# Patient Record
Sex: MALE | Race: WHITE | URBAN - METROPOLITAN AREA
[De-identification: names, ages, dates, MRNs, and addresses within clinical notes are randomized per-mention and may not be internally consistent; named-entity substitution may affect disease eponyms.]

---

## 2022-01-21 ENCOUNTER — OFFICE VISIT (OUTPATIENT)
Dept: FAMILY MEDICINE CLINIC | Age: 42
End: 2022-01-21
Payer: COMMERCIAL

## 2022-01-21 ENCOUNTER — HOSPITAL ENCOUNTER (OUTPATIENT)
Age: 42
Setting detail: SPECIMEN
Discharge: HOME | End: 2022-01-21
Payer: COMMERCIAL

## 2022-01-21 VITALS
HEIGHT: 72 IN | TEMPERATURE: 97.1 F | HEART RATE: 81 BPM | BODY MASS INDEX: 28.04 KG/M2 | RESPIRATION RATE: 12 BRPM | OXYGEN SATURATION: 100 % | WEIGHT: 207 LBS

## 2022-01-21 DIAGNOSIS — Z20.822 CLOSE EXPOSURE TO COVID-19 VIRUS: ICD-10-CM

## 2022-01-21 DIAGNOSIS — J06.9 VIRAL URI WITH COUGH: Primary | ICD-10-CM

## 2022-01-21 PROCEDURE — U0003 INFECTIOUS AGENT DETECTION BY NUCLEIC ACID (DNA OR RNA); SEVERE ACUTE RESPIRATORY SYNDROME CORONAVIRUS 2 (SARS-COV-2) (CORONAVIRUS DISEASE [COVID-19]), AMPLIFIED PROBE TECHNIQUE, MAKING USE OF HIGH THROUGHPUT TECHNOLOGIES AS DESCRIBED BY CMS-2020-01-R: HCPCS

## 2022-01-21 PROCEDURE — 99213 OFFICE O/P EST LOW 20 MIN: CPT | Performed by: PHYSICIAN ASSISTANT

## 2022-01-21 PROCEDURE — U0005 INFEC AGEN DETEC AMPLI PROBE: HCPCS

## 2022-01-21 NOTE — PROGRESS NOTES
1/21/22  Elizabeth Qiu  1980    FLU/COVID-19 CLINIC EVALUATION    HPI SYMPTOMS:    Employer: [employer]    [] Fevers  [] Chills  [x] Cough  [] Coughing up blood  [] Chest Congestion  [x] Nasal Congestion  [] Feeling short of breath  [] Sometimes  [] Frequently  [] All the time  [] Chest pain  [] Headaches  []Tolerable  [] Severe  [] Sore throat  [] Muscle aches  [] Nausea  [] Vomiting  []Unable to keep fluids down  [] Diarrhea  []Severe    [] OTHER SYMPTOMS:      Symptom Duration:   [] 1  [] 2   [x] 3   [] 4    [] 5   [] 6   [] 7   [] 8   [] 9   [] 10   [] 11   [] 12   [] 13   [] 14   [] Longer than 14 days    Symptom course:   [] Worsening     [x] Stable     [] Improving    RISK FACTORS:    [] Pregnant or possibly pregnant  [] Age over 61  [] Diabetes  [] Heart disease  [] Asthma  [] COPD/Other chronic lung diseases  [] Active Cancer  [] On Chemotherapy  [] Taking oral steroids  [] History Lymphoma/Leukemia  [x] Close contact with a lab confirmed COVID-19 patient within 14 days of symptom onset  [] History of travel from affected geographical areas within 14 days of symptom onset       VITALS:  There were no vitals filed for this visit. TESTS:    POCT FLU:  [] Positive     []Negative    ASSESSMENT:    [] Flu  [] Possible COVID-19  [] Strep    PLAN:    [] Discharge home with written instructions for:  [] Flu management  [] Possible COVID-19 infection with self-quarantine and management of symptoms  [] Follow-up with primary care physician or emergency department if worsens  [] Evaluation per physician/NP/PA in clinic  [] Sent to ER       An  electronic signature was used to authenticate this note.      --Manisha Guillory LPN on 1/46/3489 at 3:59 PM
salt gargles as needed for throat discomfort  Monitor temperature twice a day  Tylenol as needed for fevers and/or discomfort. Big deep breaths periodically throughout the day  Regular Mucinex over the counter as needed for chest congestion  If symptoms worsen -Go to the ER. Follow up with your primary care provider      I did don appropriate PPE (including N95 face mask, protective safety glasses, face shield, gloves, and gown) as recommended by the health facility/national standard best practice, during my interaction with the patient. FOLLOW-UP:  No follow-ups on file.       Melanie Sanches PA-C

## 2022-01-21 NOTE — PATIENT INSTRUCTIONS
time and self-care. Antibiotics are not used to treat a viral infection. That's because antibiotics will not help cure a viral illness. In some cases, antiviral medicine can help your body fight a serious viral infection. Follow-up care is a key part of your treatment and safety. Be sure to make and go to all appointments, and call your doctor if you are having problems. It's also a good idea to know your test results and keep a list of the medicines you take. How can you care for yourself at home? · Rest as much as possible until you feel better. · Be safe with medicines. Take your medicine exactly as prescribed. Call your doctor if you think you are having a problem with your medicine. You will get more details on the specific medicine your doctor prescribes. · Take an over-the-counter pain medicine, such as acetaminophen (Tylenol), ibuprofen (Advil, Motrin), or naproxen (Aleve), as needed for pain and fever. Read and follow all instructions on the label. Do not give aspirin to anyone younger than 20. It has been linked to Reye syndrome, a serious illness. · Drink plenty of fluids. Hot fluids, such as tea or soup, may help relieve congestion in your nose and throat. If you have kidney, heart, or liver disease and have to limit fluids, talk with your doctor before you increase the amount of fluids you drink. · Try to clear mucus from your lungs by breathing deeply and coughing. · Gargle with warm salt water once an hour. This can help reduce swelling and throat pain. Use 1 teaspoon of salt mixed in 1 cup of warm water. · Do not smoke or allow others to smoke around you. If you need help quitting, talk to your doctor about stop-smoking programs and medicines. These can increase your chances of quitting for good. To avoid spreading the virus  · Cough or sneeze into a tissue. Then throw the tissue away. · If you don't have a tissue, use your hand to cover your cough or sneeze. Then clean your hand.  You can also cough into your sleeve. · Wash your hands often. Use soap and warm water. Wash for 15 to 20 seconds each time. · If you don't have soap and water near you, you can clean your hands with alcohol wipes or gel. When should you call for help? Call your doctor now or seek immediate medical care if:    · You have a new or higher fever.     · Your fever lasts more than 48 hours.     · You have trouble breathing.     · You have a fever with a stiff neck or a severe headache.     · You are sensitive to light.     · You feel very sleepy or confused. Watch closely for changes in your health, and be sure to contact your doctor if:    · You do not get better as expected. Where can you learn more? Go to https://[facility].Petflow. org and sign in to your Getting-in account. Enter R276 in the Zarpo box to learn more about \"Viral Respiratory Infection: Care Instructions. \"     If you do not have an account, please click on the \"Sign Up Now\" link. Current as of: July 6, 2021               Content Version: 13.1  © 4850-6445 [employer], [employer]orporated. Care instructions adapted under license by [facility] ([facility]). If you have questions about a medical condition or this instruction, always ask your healthcare professional. [address] any warranty or liability for your use of this information.

## 2022-01-22 LAB
SARS-COV-2: NOT DETECTED
SOURCE: NORMAL

## 2022-03-18 ENCOUNTER — HOSPITAL ENCOUNTER (OUTPATIENT)
Age: 42
Setting detail: SPECIMEN
Discharge: HOME | End: 2022-03-18
Payer: COMMERCIAL

## 2022-03-18 LAB — MICRO: NORMAL

## 2022-03-18 PROCEDURE — 89321 SEMEN ANAL SPERM DETECTION: CPT

## 2023-04-20 ENCOUNTER — HOSPITAL ENCOUNTER (OUTPATIENT)
Dept: PHYSICAL THERAPY | Age: 43
Setting detail: THERAPIES SERIES
Discharge: HOME | End: 2023-04-20
Payer: COMMERCIAL

## 2023-04-20 PROCEDURE — 97161 PT EVAL LOW COMPLEX 20 MIN: CPT

## 2023-04-20 PROCEDURE — 97110 THERAPEUTIC EXERCISES: CPT

## 2023-04-20 ASSESSMENT — PAIN DESCRIPTION - PAIN TYPE: TYPE: CHRONIC PAIN

## 2023-04-20 ASSESSMENT — PAIN SCALES - GENERAL: PAINLEVEL_OUTOF10: 5

## 2023-04-20 ASSESSMENT — PAIN DESCRIPTION - LOCATION: LOCATION: BACK;HIP

## 2023-04-20 ASSESSMENT — PAIN DESCRIPTION - DESCRIPTORS: DESCRIPTORS: ACHING;BURNING;SHOOTING;SORE

## 2023-04-20 NOTE — PLAN OF CARE
Outpatient Physical Therapy           [place]           [] Phone: [phone]   Fax: [phone]  [place]           [] Phone: [phone]   Fax: [phone]     To: Mariah Cantu MD     From: Daniella Dahl, PT, PT     Patient: Olya Eubanks       : 1980  Diagnosis: Radiculopathy, lumbar region [M54.16] Diagnosis: lumbar  Treatment Diagnosis: decreased activity tolerance limited by LBP  Date: 2023    Physical Therapy Certification/Re-Certification Form  Dear Dr. Esther Valente,   The following patient has been evaluated for physical therapy services and for therapy to continue, insurance requires physician review of the treatment plan initially and every 90 days. Please review the attached evaluation and/or summary of the patient's plan of care, and verify that you agree therapy should continue by signing the attached document and sending it back to our office. Assessment:    Assessment: Pt is 41year old male with onset of lower back and L sided radicular pain that started around 2022. Pt now has difficulties completing all tasks at work without pain, transitioning from sit to/from stand without pain, and prolonged activity without pain. Pt demo deficits this date that include extension bias with activities, limited thoracic/lumbar mobility, minimal restrictions in lumbar joint mobility, poor TA activation, limited pelvic AROM, decreased posterior chain strength. Pt will benefit with PT services with progression of strength/ROM, manual and modalities to return to PLOF.     Plan of Care/Treatment to date:  [x] Therapeutic Exercise  [] Modalities:  [x] Therapeutic Activity     [] Ultrasound  [x] Electrical Stimulation  [] Gait Training      [] Cervical Traction [] Lumbar Traction  [x] Neuromuscular Re-education    [x] Cold/hotpack [] Iontophoresis   [x] Instruction in HEP      [] Vasopneumatic    [] Dry Needling  [x] Manual Therapy               [] Aquatic Therapy       Other:

## 2023-04-20 NOTE — PROGRESS NOTES
is necessary. Physician's Signature:  ___________________________   Date:_______                                                                   Nikky Bolaños MD        Physician Comments: _______________________________________________    Please sign and return to   [facility]. Please fax to the location listed below.  Oscar Liu for this referral!    [address], [address] [id number]  Dept: [phone]  Dept Fax: [phone]  Loc: [phone]

## 2023-04-20 NOTE — FLOWSHEET NOTE
eval           Exercises: (No more than 4 columns)   Exercise/Equipment 4/20/23 #1 Date Date           WARM UP                     TABLE      *Prone Prop      *Bridge      *Prone Alt UE      *Prone Alt Hip Ext      Seated/Standing Lumbar Extension       PPT      PPT + March      SL Hip Abduction      STANDING                                                     PROPRIOCEPTION                                    MODALITIES                      Other Therapeutic Activities/Education:  Patient received education on their current pathology and how their condition effects them with their functional activities. Patient understood discussion and questions were answered. Patient understands their activity limitations and understands rational for treatment progression. Home Exercise Program:  *HO issued, reviewed and discussed with patient. All questions answered. Pt agreed to comply. Manual Treatments:  --      Modalities:  --      Communication with other providers:  eval sent 4/20/23      Assessment:  (Response towards treatment session) (Pain Rating)  Assessment: Pt is 41year old male with onset of lower back and L sided radicular pain that started around October/November 2022. Pt now has difficulties completing all tasks at work without pain, transitioning from sit to/from stand without pain, and prolonged activity without pain. Pt demo deficits this date that include extension bias with activities, limited thoracic/lumbar mobility, minimal restrictions in lumbar joint mobility, poor TA activation, limited pelvic AROM, decreased posterior chain strength. Pt will benefit with PT services with progression of strength/ROM, manual and modalities to return to PLOF.       Plan for Next Session: --       Time In / Time Out:   8103-3684       Timed Code/Total Treatment Minutes:  (1) PT eval  (1) TE      Next Progress Note due:  10th visit      Plan of Care Interventions:  [x] Therapeutic Exercise  [] Modalities:  [x]

## 2023-04-29 ENCOUNTER — HOSPITAL ENCOUNTER (OUTPATIENT)
Dept: PHYSICAL THERAPY | Age: 43
Setting detail: THERAPIES SERIES
Discharge: HOME | End: 2023-04-29
Payer: COMMERCIAL

## 2023-04-29 NOTE — FLOWSHEET NOTE
Physical Therapy  Cancellation/No-show Note  Patient Name:  Crys Gordon  :  1980   Date:  2023  Cancelled visits to date: 0  No-shows to date: 1    For today's appointment patient:  []  Cancelled  []  Rescheduled appointment  [x]  No-show     Reason given by patient:  []  Patient ill  []  Conflicting appointment  []  No transportation    []  Conflict with work  []  No reason given  []  Other:     Comments:      Electronically signed by:  Padma Estrada PT, 2023, 8:11 AM

## 2023-05-01 ENCOUNTER — HOSPITAL ENCOUNTER (OUTPATIENT)
Dept: PHYSICAL THERAPY | Age: 43
Setting detail: THERAPIES SERIES
Discharge: HOME | End: 2023-05-01
Payer: COMMERCIAL

## 2023-05-01 PROCEDURE — 97530 THERAPEUTIC ACTIVITIES: CPT

## 2023-05-01 PROCEDURE — 97110 THERAPEUTIC EXERCISES: CPT

## 2023-05-01 NOTE — FLOWSHEET NOTE
April 20. Sleeping has also been better. Was not able to attend last session due to jury duty. Any changes in Ambulatory Summary Sheet? None        Objective:  See eval     Intermittent \"Pinching\" pain into left glut      Exercises: (No more than 4 columns)   Exercise/Equipment 4/20/23 #1 Date 5/1/23 #2 Date           WARM UP         Nu step  L3 x5'          TABLE      *Prone Prop  3'    *Bridge  1x10    *Prone Alt UE  1x10    *Prone Alt Hip Ext  1x10    Seated/Standing Lumbar Extension   Standing 1x10    PPT  1x10    PPT + March  1x10    SL Hip Abduction  1x10    STANDING                                                     PROPRIOCEPTION                                    MODALITIES                      Other Therapeutic Activities/Education:  Patient received education on their current pathology and how their condition effects them with their functional activities. Patient understood discussion and questions were answered. Patient understands their activity limitations and understands rational for treatment progression. Home Exercise Program:  *HO issued, reviewed and discussed with patient. All questions answered. Pt agreed to comply. Reviewed HEP and added PPT, PPT with march, sidelying hip abd and standing trunk ext. Provided with handouts. Manual Treatments:  --      Modalities:  --      Communication with other providers:  adrienne sent 4/20/23      Assessment:  (Response towards treatment session) (Pain Rating) Pt demonstrated overall good tolerance with advanced exercises today without adverse reaction. Still noticing some improvements since his second injection on April 20. Will continue to benefit with PT services with progression of strength/ROM, manual and modalities to return to PLOF. End pain 1-2/10    Assessment: Pt is 41year old male with onset of lower back and L sided radicular pain that started around October/November 2022.  Pt now has difficulties completing all tasks at work

## 2023-05-03 ENCOUNTER — HOSPITAL ENCOUNTER (OUTPATIENT)
Dept: PHYSICAL THERAPY | Age: 43
Setting detail: THERAPIES SERIES
Discharge: HOME | End: 2023-05-03
Payer: COMMERCIAL

## 2023-05-03 PROCEDURE — 97110 THERAPEUTIC EXERCISES: CPT

## 2023-05-03 PROCEDURE — 97530 THERAPEUTIC ACTIVITIES: CPT

## 2023-05-03 NOTE — FLOWSHEET NOTE
Outpatient Physical Therapy  [place]           [x] Phone: [phone]   Fax: [phone]  [place]           [] Phone: [phone]   Fax: [phone]        Physical Therapy Daily Treatment Note  Date:  5/3/2023    Patient Name:  Nhung Lan    :  1980  MRN: [id number]  Restrictions/Precautions: NONE  Diagnosis:   Radiculopathy, lumbar region [M54.16] Diagnosis: lumbar  Date of Injury/Surgery: -  Treatment Diagnosis:  decreased activity tolerance limited by LBP  Insurance/Certification information: R  Referring Physician:  Latricia Taylor MD     PCP: No primary care provider on file. Next Doctor Visit:  --  Plan of care signed (Y/N):  N, sent 23  Outcome Measure: Oswestry: see folder  Visit# / total visits:   3/10  Pain level: 2/10   Goals:     Patient goals: improve pain  Short term goals  Time Frame for Short term goals: 5 weeks  Pt demo I w/ HEP and symptom management  Pt demo Oswestry score <40% disability to return to all ADL's  Pt demo independence with centralizing symptoms with exercise/stretches  Pt reports at least a 25% improvement in his symptoms since the start of therapy  Pt demo 5/5 BLE strength in all directions without pain    Summary of Evaluation:  Assessment: Pt is 41year old male with onset of lower back and L sided radicular pain that started around 2022. Pt now has difficulties completing all tasks at work without pain, transitioning from sit to/from stand without pain, and prolonged activity without pain. Pt demo deficits this date that include extension bias with activities, limited thoracic/lumbar mobility, minimal restrictions in lumbar joint mobility, poor TA activation, limited pelvic AROM, decreased posterior chain strength. Pt will benefit with PT services with progression of strength/ROM, manual and modalities to return to PLOF.       Subjective:  Pt states his pain has stayed consistently at a 2/10 since his injection, located at the L

## 2023-05-08 ENCOUNTER — HOSPITAL ENCOUNTER (OUTPATIENT)
Dept: PHYSICAL THERAPY | Age: 43
Setting detail: THERAPIES SERIES
Discharge: HOME | End: 2023-05-08
Payer: COMMERCIAL

## 2023-05-08 PROCEDURE — 97530 THERAPEUTIC ACTIVITIES: CPT

## 2023-05-08 PROCEDURE — 97110 THERAPEUTIC EXERCISES: CPT

## 2023-05-08 NOTE — FLOWSHEET NOTE
Outpatient Physical Therapy  [place]           [x] Phone: [phone]   Fax: [phone]  [facility]           [] Phone: [phone]   Fax: [phone]        Physical Therapy Daily Treatment Note  Date:  2023    Patient Name:  Paulina Lopez    :  1980  MRN: [id number]  Restrictions/Precautions: NONE  Diagnosis:   Radiculopathy, lumbar region [M54.16] Diagnosis: lumbar  Date of Injury/Surgery: -  Treatment Diagnosis:  decreased activity tolerance limited by LBP  Insurance/Certification information: R  Referring Physician:  Tracy Luna MD     PCP: No primary care provider on file. Next Doctor Visit:  --  Plan of care signed (Y/N):  N, sent 23  Outcome Measure: Oswestry: see folder  Visit# / total visits:   4/10  Pain level: 2/10   Goals:     Patient goals: improve pain  Short term goals  Time Frame for Short term goals: 5 weeks  Pt demo I w/ HEP and symptom management  Pt demo Oswestry score <40% disability to return to all ADL's  Pt demo independence with centralizing symptoms with exercise/stretches  Pt reports at least a 25% improvement in his symptoms since the start of therapy  Pt demo 5/5 BLE strength in all directions without pain    Summary of Evaluation:  Assessment: Pt is 41year old male with onset of lower back and L sided radicular pain that started around 2022. Pt now has difficulties completing all tasks at work without pain, transitioning from sit to/from stand without pain, and prolonged activity without pain. Pt demo deficits this date that include extension bias with activities, limited thoracic/lumbar mobility, minimal restrictions in lumbar joint mobility, poor TA activation, limited pelvic AROM, decreased posterior chain strength. Pt will benefit with PT services with progression of strength/ROM, manual and modalities to return to PLOF.       Subjective:  Pt states his pain has stayed consistently at a 2/10 since his injection, located at the L

## 2023-05-12 ENCOUNTER — HOSPITAL ENCOUNTER (OUTPATIENT)
Dept: PHYSICAL THERAPY | Age: 43
Setting detail: THERAPIES SERIES
Discharge: HOME | End: 2023-05-12
Payer: COMMERCIAL

## 2023-05-12 PROCEDURE — 97110 THERAPEUTIC EXERCISES: CPT

## 2023-05-12 NOTE — FLOWSHEET NOTE
Outpatient Physical Therapy  [place]           [x] Phone: [phone]   Fax: [phone]  [place] [facility]           [] Phone: [phone]   Fax: [phone]        Physical Therapy Daily Treatment Note  Date:  2023    Patient Name:  Jair Rosario    :  1980  MRN: [id number]  Restrictions/Precautions: NONE  Diagnosis:   Radiculopathy, lumbar region [M54.16] Diagnosis: lumbar  Date of Injury/Surgery: -  Treatment Diagnosis:  decreased activity tolerance limited by LBP  Insurance/Certification information: [facility]  Referring Physician:  Nighat Sosa MD     PCP: No primary care provider on file. Next Doctor Visit:  --  Plan of care signed (Y/N):  N, sent 23  Outcome Measure: Oswestry: see folder  Visit# / total visits:   5/10  Pain level: 2/10   Goals:     Patient goals: improve pain  Short term goals  Time Frame for Short term goals: 5 weeks  Pt demo I w/ HEP and symptom management  Pt demo Oswestry score <40% disability to return to all ADL's  Pt demo independence with centralizing symptoms with exercise/stretches  Pt reports at least a 25% improvement in his symptoms since the start of therapy  Pt demo 5/5 BLE strength in all directions without pain    Summary of Evaluation:  Assessment: Pt is 41year old male with onset of lower back and L sided radicular pain that started around 2022. Pt now has difficulties completing all tasks at work without pain, transitioning from sit to/from stand without pain, and prolonged activity without pain. Pt demo deficits this date that include extension bias with activities, limited thoracic/lumbar mobility, minimal restrictions in lumbar joint mobility, poor TA activation, limited pelvic AROM, decreased posterior chain strength. Pt will benefit with PT services with progression of strength/ROM, manual and modalities to return to PLOF. Subjective:   Pt arrives to tx session reporting 2/10 pain in LB.       Any changes in Ambulatory

## 2023-05-17 ENCOUNTER — HOSPITAL ENCOUNTER (OUTPATIENT)
Dept: PHYSICAL THERAPY | Age: 43
Setting detail: THERAPIES SERIES
Discharge: HOME | End: 2023-05-17
Payer: COMMERCIAL

## 2023-05-17 PROCEDURE — 97110 THERAPEUTIC EXERCISES: CPT

## 2023-05-17 NOTE — FLOWSHEET NOTE
Outpatient Physical Therapy  [place]           [x] Phone: [phone]   Fax: [phone]  [place]           [] Phone: [phone]   Fax: [phone]        Physical Therapy Daily Treatment Note  Date:  2023    Patient Name:  Crys Gordon    :  1980  MRN: [id number]  Restrictions/Precautions: NONE  Diagnosis:   Radiculopathy, lumbar region [M54.16] Diagnosis: lumbar  Date of Injury/Surgery: -  Treatment Diagnosis:  decreased activity tolerance limited by LBP  Insurance/Certification information: R  Referring Physician:  Hudson Montelongo MD     PCP: No primary care provider on file. Next Doctor Visit:  --  Plan of care signed (Y/N):  N, sent 23  Outcome Measure: Oswestry: see folder  Visit# / total visits:   6/10  Pain level: 3/10   Goals:     Patient goals: improve pain  Short term goals  Time Frame for Short term goals: 5 weeks  Pt demo I w/ HEP and symptom management  Pt demo Oswestry score <40% disability to return to all ADL's  Pt demo independence with centralizing symptoms with exercise/stretches  Pt reports at least a 25% improvement in his symptoms since the start of therapy  Pt demo 5/5 BLE strength in all directions without pain    Summary of Evaluation:  Assessment: Pt is 41year old male with onset of lower back and L sided radicular pain that started around 2022. Pt now has difficulties completing all tasks at work without pain, transitioning from sit to/from stand without pain, and prolonged activity without pain. Pt demo deficits this date that include extension bias with activities, limited thoracic/lumbar mobility, minimal restrictions in lumbar joint mobility, poor TA activation, limited pelvic AROM, decreased posterior chain strength. Pt will benefit with PT services with progression of strength/ROM, manual and modalities to return to PLOF. Subjective:   Pt states he is not sure why but has had a little more pain last couple days.       Any

## 2023-06-07 ENCOUNTER — HOSPITAL ENCOUNTER (OUTPATIENT)
Dept: PHYSICAL THERAPY | Age: 43
Setting detail: THERAPIES SERIES
Discharge: HOME | End: 2023-06-07
Payer: COMMERCIAL

## 2023-06-07 PROCEDURE — 97110 THERAPEUTIC EXERCISES: CPT

## 2023-06-07 NOTE — FLOWSHEET NOTE
Outpatient Physical Therapy  [place]           [x] Phone: [phone]   Fax: [phone]  Robb Rae           [] Phone: [phone]   Fax: [phone]        Physical Therapy Daily Treatment Note  Date:  2023    Patient Name:  Sherie Story    :  1980  MRN: [id number]  Restrictions/Precautions: NONE  Diagnosis:   Radiculopathy, lumbar region [M54.16] Diagnosis: lumbar  Date of Injury/Surgery: -  Treatment Diagnosis:  decreased activity tolerance limited by LBP  Insurance/Certification information: R  Referring Physician:  Tito Hines MD     PCP: No primary care provider on file. Next Doctor Visit:  --  Plan of care signed (Y/N):  N, sent 23  Outcome Measure: Oswestry: see folder  Visit# / total visits:   7/10  Pain level: 3/10   Goals:     Patient goals: improve pain  Short term goals  Time Frame for Short term goals: 5 weeks  Pt demo I w/ HEP and symptom management  Pt demo Oswestry score <40% disability to return to all ADL's  Pt demo independence with centralizing symptoms with exercise/stretches  Pt reports at least a 25% improvement in his symptoms since the start of therapy  Pt demo 5/5 BLE strength in all directions without pain    Summary of Evaluation:  Assessment: Pt is 41year old male with onset of lower back and L sided radicular pain that started around 2022. Pt now has difficulties completing all tasks at work without pain, transitioning from sit to/from stand without pain, and prolonged activity without pain. Pt demo deficits this date that include extension bias with activities, limited thoracic/lumbar mobility, minimal restrictions in lumbar joint mobility, poor TA activation, limited pelvic AROM, decreased posterior chain strength. Pt will benefit with PT services with progression of strength/ROM, manual and modalities to return to PLOF.       Subjective:  Pt states that pain is a 3/10 today, took ibuprofen around 10AM. Pain has been more

## 2023-07-18 ENCOUNTER — HOSPITAL ENCOUNTER (OUTPATIENT)
Age: 43
Setting detail: SPECIMEN
Discharge: HOME | End: 2023-07-18
Payer: COMMERCIAL

## 2023-07-18 LAB — MICRO: NORMAL

## 2023-07-18 PROCEDURE — 89321 SEMEN ANAL SPERM DETECTION: CPT
